# Patient Record
Sex: MALE | Race: BLACK OR AFRICAN AMERICAN | NOT HISPANIC OR LATINO | URBAN - METROPOLITAN AREA
[De-identification: names, ages, dates, MRNs, and addresses within clinical notes are randomized per-mention and may not be internally consistent; named-entity substitution may affect disease eponyms.]

---

## 2017-11-29 ENCOUNTER — RECORDS - HEALTHEAST (OUTPATIENT)
Dept: GENERAL RADIOLOGY | Facility: CLINIC | Age: 27
End: 2017-11-29

## 2017-11-29 ENCOUNTER — OFFICE VISIT - HEALTHEAST (OUTPATIENT)
Dept: FAMILY MEDICINE | Facility: CLINIC | Age: 27
End: 2017-11-29

## 2017-11-29 DIAGNOSIS — S83.91XA RIGHT KNEE SPRAIN: ICD-10-CM

## 2017-11-29 DIAGNOSIS — S89.91XA UNSPECIFIED INJURY OF RIGHT LOWER LEG, INITIAL ENCOUNTER: ICD-10-CM

## 2017-11-29 DIAGNOSIS — S89.91XA RIGHT KNEE INJURY, INITIAL ENCOUNTER: ICD-10-CM

## 2021-05-31 VITALS — BODY MASS INDEX: 27.67 KG/M2 | WEIGHT: 204 LBS

## 2021-06-25 NOTE — PROGRESS NOTES
Progress Notes by Howie Ceron DO at 11/29/2017  1:50 PM     Author: Howie Ceron DO Service: -- Author Type: Physician    Filed: 11/30/2017  7:37 AM Encounter Date: 11/29/2017 Status: Signed    : Howie Ceron DO (Physician)       Chief Complaint   Patient presents with   ? pos sprain knee     x 2 days. right knee swelling     History of Present Illness: Nursing notes reviewed. Patient was playing basketball, when he jumped up and landed on his right lower extremity, with his right knee bowing outward. He now has pain over both the medial and lateral patellar joint line areas, being more significant on the medial side.      Review of systems: See history of present illness, otherwise negative.     No current outpatient prescriptions on file.     No current facility-administered medications for this visit.        No past medical history on file.   No past surgical history on file.   Social History     Social History   ? Marital status: Single     Spouse name: N/A   ? Number of children: N/A   ? Years of education: N/A     Social History Main Topics   ? Smoking status: Never Smoker   ? Smokeless tobacco: Never Used   ? Alcohol use None   ? Drug use: None   ? Sexual activity: Not Asked     Other Topics Concern   ? None     Social History Narrative   ? None       History   Smoking Status   ? Never Smoker   Smokeless Tobacco   ? Never Used      Exam:   Blood pressure 122/80, pulse 74, temperature 98.8  F (37.1  C), temperature source Oral, resp. rate 14, weight 204 lb (92.5 kg), SpO2 99 %.    EXAM:   General: Vital signs reviewed. Patient is in no acute appearing distress. Breathing is non labored appearing. Patient is alert and oriented x 3.   Right knee exam shows patient has increased right knee pain especially with flexion beyond about 30  from full extension during my exam when he was up on exam table.  He can actively extend knee and normal range of motion.  With active knee flexion, he has  discomfort especially over his inferior/medial anterior patellar region.  He also has moderate edema and increased skin temperature here.  There is no increased laxity noted with distraction of the collateral ligaments, and the ACL and PCL ligaments.  No discomfort with distraction of these ligaments.  Patient had increased discomfort in the medial/anterior/inferior patellar region with internal foot rotation with Joi's test.  Patient is also tender in the same area.  I do not palpate any bony deformity.  Later during exam when patient was standing in room chair, and noted she was able to sit with his knee bent approximately 70 .  I reviewed the x-ray study with patient at time of exam, noting no fracture, but there was some fluid noted in the knee.  I reviewed the radiologist's report with patient also had exam, where no fracture was noted, but a large joint effusion was noted.  Patient got good relief of discomfort with the wearing of a hinged knee support.  Assessment/Plan   1. Right knee injury, initial encounter  XR Knee Right 1 or 2 VWS   2. Right knee sprain  Knee support       Patient Instructions     Also see info below. Be seen again in 7-10 days if symptoms are not better, sooner if feeling any worse. Persistent pain may indicate a cartilage problem needing addressing.    Self-Care for Strains and Sprains  Most minor strains and sprains can be treated with self-care. Recovering from a strain or sprain may take 6 to 8 weeks. Your self-care goal is to reduce pain and immobilize the injury to speed healing.     A sprain injures ligaments (tissue that connects bones to bones).        A strain injures muscles or tendons (tissue that connects muscles to bones).   Support the injured area  Wrapping the injured area provides support for short, necessary activities. Be careful not to wrap the area too tightly. This could cut off the blood supply.    Support a wrist, elbow, or shoulder with a sling.    Wrap an  ankle or knee with an elastic bandage.    Tape a finger or toe to the one next to it.  Use cold and heat  Cold reduces swelling. Both cold and heat reduce pain. Heat should not be used in the initial treatment of the injury. When using cold or heat, always place a towel between the pack and your skin.    Apply ice or a cold pack 10 to 15 minutes every hour youre awake for the first 2 days.    After the swelling goes down, use cold or heat to control pain. Dont use heat late in the day, since it can cause swelling when youre not active.  Rest and elevate  Rest and elevation help your injury heal faster.    Raise the injured area above your heart level.    Keep the injured area from moving.    Limit the use of the joint or limb.  Use medicine    Aspirin reduces pain and swelling. (Note: Dont give aspirin to a child 18 or younger unless prescribed by the doctor.)    Aspirin substitutes, such as ibuprofen, can reduce pain. Some substitutes reduce swelling, too. Ask your pharmacist which substitutes you can use.  Call your doctor if:    The injured joint wont move, or bones make a grating sound when they move.    You cant put weight on the injured area, even after 24 hours.    The injured body part is cold, blue, or numb.    The joint or limb appears bent or crooked.    Pain increases or doesnt improve in 4 days.    When pressing along the injured area, you notice a spot that is especially painful.   Date Last Reviewed: 9/29/2015 2000-2016 [employer]. [address] [place] [address], [place] [id number]. All rights reserved. This information is not intended as a substitute for professional medical care. Always follow your healthcare professional's instructions.           Howie Ceron,